# Patient Record
Sex: MALE | Race: WHITE | Employment: UNEMPLOYED | URBAN - METROPOLITAN AREA
[De-identification: names, ages, dates, MRNs, and addresses within clinical notes are randomized per-mention and may not be internally consistent; named-entity substitution may affect disease eponyms.]

---

## 2024-08-29 ENCOUNTER — HOSPITAL ENCOUNTER (EMERGENCY)
Age: 39
Discharge: HOME | End: 2024-08-29
Attending: EMERGENCY MEDICINE

## 2024-08-29 ENCOUNTER — APPOINTMENT (OUTPATIENT)
Dept: GENERAL RADIOLOGY | Age: 39
End: 2024-08-29

## 2024-08-29 VITALS
BODY MASS INDEX: 27.4 KG/M2 | RESPIRATION RATE: 17 BRPM | DIASTOLIC BLOOD PRESSURE: 83 MMHG | SYSTOLIC BLOOD PRESSURE: 133 MMHG | OXYGEN SATURATION: 100 % | TEMPERATURE: 97.9 F | WEIGHT: 185 LBS | HEART RATE: 54 BPM | HEIGHT: 69 IN

## 2024-08-29 DIAGNOSIS — K59.00 CONSTIPATION, UNSPECIFIED CONSTIPATION TYPE: Primary | ICD-10-CM

## 2024-08-29 LAB
ALBUMIN SERPL-MCNC: 4 G/DL (ref 3.5–5)
ALBUMIN/GLOB SERPL: 1.4 (ref 1–1.9)
ALP SERPL-CCNC: 63 U/L (ref 40–129)
ALT SERPL-CCNC: 16 U/L (ref 12–65)
ANION GAP SERPL CALC-SCNC: 9 MMOL/L (ref 9–18)
AST SERPL-CCNC: 28 U/L (ref 15–37)
BASOPHILS # BLD: 0.1 K/UL (ref 0–0.2)
BASOPHILS NFR BLD: 1 % (ref 0–2)
BILIRUB SERPL-MCNC: 0.7 MG/DL (ref 0–1.2)
BUN SERPL-MCNC: 13 MG/DL (ref 6–23)
CALCIUM SERPL-MCNC: 9.1 MG/DL (ref 8.8–10.2)
CHLORIDE SERPL-SCNC: 105 MMOL/L (ref 98–107)
CO2 SERPL-SCNC: 26 MMOL/L (ref 20–28)
CREAT SERPL-MCNC: 1.04 MG/DL (ref 0.8–1.3)
DIFFERENTIAL METHOD BLD: ABNORMAL
EOSINOPHIL # BLD: 0.4 K/UL (ref 0–0.8)
EOSINOPHIL NFR BLD: 6 % (ref 0.5–7.8)
ERYTHROCYTE [DISTWIDTH] IN BLOOD BY AUTOMATED COUNT: 12 % (ref 11.9–14.6)
GLOBULIN SER CALC-MCNC: 2.9 G/DL (ref 2.3–3.5)
GLUCOSE SERPL-MCNC: 100 MG/DL (ref 70–99)
HCT VFR BLD AUTO: 46 % (ref 41.1–50.3)
HGB BLD-MCNC: 15.9 G/DL (ref 13.6–17.2)
IMM GRANULOCYTES # BLD AUTO: 0 K/UL (ref 0–0.5)
IMM GRANULOCYTES NFR BLD AUTO: 0 % (ref 0–5)
LIPASE SERPL-CCNC: 17 U/L (ref 13–60)
LYMPHOCYTES # BLD: 1.3 K/UL (ref 0.5–4.6)
LYMPHOCYTES NFR BLD: 18 % (ref 13–44)
MCH RBC QN AUTO: 31.9 PG (ref 26.1–32.9)
MCHC RBC AUTO-ENTMCNC: 34.6 G/DL (ref 31.4–35)
MCV RBC AUTO: 92.2 FL (ref 82–102)
MONOCYTES # BLD: 0.5 K/UL (ref 0.1–1.3)
MONOCYTES NFR BLD: 7 % (ref 4–12)
NEUTS SEG # BLD: 4.9 K/UL (ref 1.7–8.2)
NEUTS SEG NFR BLD: 68 % (ref 43–78)
NRBC # BLD: 0 K/UL (ref 0–0.2)
PLATELET # BLD AUTO: 154 K/UL (ref 150–450)
PMV BLD AUTO: 12.9 FL (ref 9.4–12.3)
POTASSIUM SERPL-SCNC: 4.6 MMOL/L (ref 3.5–5.1)
PROT SERPL-MCNC: 6.8 G/DL (ref 6.3–8.2)
RBC # BLD AUTO: 4.99 M/UL (ref 4.23–5.6)
SODIUM SERPL-SCNC: 140 MMOL/L (ref 136–145)
WBC # BLD AUTO: 7.2 K/UL (ref 4.3–11.1)

## 2024-08-29 PROCEDURE — 85025 COMPLETE CBC W/AUTO DIFF WBC: CPT

## 2024-08-29 PROCEDURE — 83690 ASSAY OF LIPASE: CPT

## 2024-08-29 PROCEDURE — 2580000003 HC RX 258: Performed by: EMERGENCY MEDICINE

## 2024-08-29 PROCEDURE — 80053 COMPREHEN METABOLIC PANEL: CPT

## 2024-08-29 PROCEDURE — 99284 EMERGENCY DEPT VISIT MOD MDM: CPT

## 2024-08-29 PROCEDURE — 74018 RADEX ABDOMEN 1 VIEW: CPT

## 2024-08-29 RX ORDER — POLYETHYLENE GLYCOL 3350 17 G/17G
17 POWDER, FOR SOLUTION ORAL DAILY
Qty: 238 G | Refills: 1 | Status: SHIPPED | OUTPATIENT
Start: 2024-08-29 | End: 2024-09-13

## 2024-08-29 RX ORDER — 0.9 % SODIUM CHLORIDE 0.9 %
1000 INTRAVENOUS SOLUTION INTRAVENOUS
Status: COMPLETED | OUTPATIENT
Start: 2024-08-29 | End: 2024-08-29

## 2024-08-29 RX ORDER — DOCUSATE SODIUM 100 MG/1
100 CAPSULE, LIQUID FILLED ORAL 2 TIMES DAILY
Qty: 60 CAPSULE | Refills: 0 | Status: SHIPPED | OUTPATIENT
Start: 2024-08-29

## 2024-08-29 RX ORDER — ONDANSETRON 2 MG/ML
4 INJECTION INTRAMUSCULAR; INTRAVENOUS
Status: DISCONTINUED | OUTPATIENT
Start: 2024-08-29 | End: 2024-08-29 | Stop reason: HOSPADM

## 2024-08-29 RX ADMIN — SODIUM CHLORIDE 1000 ML: 9 INJECTION, SOLUTION INTRAVENOUS at 10:41

## 2024-08-29 ASSESSMENT — PAIN - FUNCTIONAL ASSESSMENT: PAIN_FUNCTIONAL_ASSESSMENT: NONE - DENIES PAIN

## 2024-08-29 ASSESSMENT — LIFESTYLE VARIABLES
HOW OFTEN DO YOU HAVE A DRINK CONTAINING ALCOHOL: NEVER
HOW MANY STANDARD DRINKS CONTAINING ALCOHOL DO YOU HAVE ON A TYPICAL DAY: PATIENT DOES NOT DRINK

## 2024-08-29 NOTE — ED PROVIDER NOTES
Emergency Department Provider Note       PCP: None, None   Age: 39 y.o.   Sex: male     DISPOSITION Decision To Discharge 08/29/2024 11:15:26 AM  Condition at Disposition: Stable       ICD-10-CM    1. Constipation, unspecified constipation type  K59.00           Medical Decision Making     10:09 AM EDT  Patient has a reassuring abdominal examination, no tenderness to palpation.  I do not think that CT imaging is indicated at this time.  Will obtain an x-ray to evaluate for stool burden, unlikely event of possible air-fluid levels.  Will also perform rectal exam to ensure the patient is not impacted.  If not impacted will plan to proceed with laxative    11:17 AM EDT  No evidence of impaction, x-ray shows normal bowel gas pattern, possible mild increase stool burden in ascending colon.  Will place the patient on stool softener and a laxative.  He can use enemas as needed.  We have discussed return precautions.  He is comfortable this plan understands reasons to return.  His abdominal exam remains reassuring I do not think additional imaging is indicated at this time.     1 acute complicated illness or injury.  Prescription drug management performed.    I independently ordered and reviewed each unique test.  I reviewed external records: provider visit note from PCP.  I reviewed external records: provider visit note from outside specialist.                     History     39-year-old male with a history of tachycardia on propranolol and clonidine presenting to the emergency department for evaluation of constipation.  Patient states he has not had a bowel movement in 2 weeks.  He notes some intermittent crampy abdominal discomfort.  Notes a history of constipation once prior that ultimately resolved with use of an oil-based enema.  Patient states that during the last 2 weeks he has taken a stool softener a couple of times, he used an oil-based enema 1 time.  States he is unsure if he has taken a laxative or not.  He  Result   No acute findings in the abdomen.      Electronically signed by Keron Muñiz                   No results for input(s): \"COVID19\" in the last 72 hours.     Voice dictation software was used during the making of this note.  This software is not perfect and grammatical and other typographical errors may be present.  This note has not been completely proofread for errors.     Jomar, Demetrius CASTRO MD  08/29/24 1295

## 2024-08-29 NOTE — DISCHARGE INSTRUCTIONS
ID band present and verified. Family is in the lobby.  Your evaluation in the emergency department today is reassuring.  Your x-ray shows some increased stool but no severe constipation, no concerning findings.  There is no impaction on rectal exam.    Take the laxative, MiraLAX, once a day as needed to have a bowel movement.  After 3 days if you are not having a bowel movement you can increase to twice a day.  Stop taking it once you have had a bowel movement.    Take Colace, the stool softener, twice a day for at least a month.  Discussed with your primary care doctor whether you should go on this medication for a longer period of time.    Return to the emergency department for any change in symptoms or concerns.

## 2024-08-29 NOTE — ED TRIAGE NOTES
Pt reports not having BM X 2 weeks. Reports lower abd pain. Denies vomiting. States he tried multiple OTC medications without relief.

## 2024-08-29 NOTE — ED NOTES
I have reviewed discharge instructions with the patient.  The patient verbalized understanding.    Patient left ED via Discharge Method: ambulatory to Home with self.    Opportunity for questions and clarification provided.       Patient given 2 scripts.         To continue your aftercare when you leave the hospital, you may receive an automated call from our care team to check in on how you are doing.  This is a free service and part of our promise to provide the best care and service to meet your aftercare needs.” If you have questions, or wish to unsubscribe from this service please call [phone].  Thank you for Choosing our [facility] Emergency Department.        Anna Covnigton LPN  08/29/24 1141

## 2024-09-30 ENCOUNTER — HOSPITAL ENCOUNTER (EMERGENCY)
Age: 39
Discharge: HOME | End: 2024-09-30

## 2024-09-30 VITALS
TEMPERATURE: 98 F | OXYGEN SATURATION: 98 % | RESPIRATION RATE: 16 BRPM | SYSTOLIC BLOOD PRESSURE: 139 MMHG | BODY MASS INDEX: 28.14 KG/M2 | HEIGHT: 69 IN | HEART RATE: 58 BPM | DIASTOLIC BLOOD PRESSURE: 99 MMHG | WEIGHT: 190 LBS

## 2024-09-30 DIAGNOSIS — K04.7 DENTAL ABSCESS: ICD-10-CM

## 2024-09-30 DIAGNOSIS — K08.89 PAIN, DENTAL: Primary | ICD-10-CM

## 2024-09-30 PROCEDURE — 6370000000 HC RX 637 (ALT 250 FOR IP): Performed by: PHYSICIAN ASSISTANT

## 2024-09-30 PROCEDURE — 99283 EMERGENCY DEPT VISIT LOW MDM: CPT

## 2024-09-30 RX ORDER — CLINDAMYCIN HCL 150 MG
300 CAPSULE ORAL
Status: COMPLETED | OUTPATIENT
Start: 2024-09-30 | End: 2024-09-30

## 2024-09-30 RX ORDER — CLINDAMYCIN HCL 150 MG
300 CAPSULE ORAL 4 TIMES DAILY
Qty: 80 CAPSULE | Refills: 0 | Status: SHIPPED | OUTPATIENT
Start: 2024-09-30 | End: 2024-10-10

## 2024-09-30 RX ORDER — NAPROXEN 250 MG/1
500 TABLET ORAL
Status: COMPLETED | OUTPATIENT
Start: 2024-09-30 | End: 2024-09-30

## 2024-09-30 RX ORDER — DICLOFENAC POTASSIUM 50 MG/1
50 TABLET, FILM COATED ORAL 3 TIMES DAILY PRN
Qty: 30 TABLET | Refills: 0 | Status: SHIPPED | OUTPATIENT
Start: 2024-09-30

## 2024-09-30 RX ADMIN — CLINDAMYCIN HYDROCHLORIDE 300 MG: 150 CAPSULE ORAL at 09:29

## 2024-09-30 RX ADMIN — NAPROXEN 500 MG: 250 TABLET ORAL at 09:29

## 2024-09-30 ASSESSMENT — LIFESTYLE VARIABLES
HOW MANY STANDARD DRINKS CONTAINING ALCOHOL DO YOU HAVE ON A TYPICAL DAY: PATIENT DOES NOT DRINK
HOW OFTEN DO YOU HAVE A DRINK CONTAINING ALCOHOL: NEVER

## 2024-09-30 ASSESSMENT — PAIN SCALES - GENERAL: PAINLEVEL_OUTOF10: 5

## 2024-09-30 ASSESSMENT — PAIN DESCRIPTION - LOCATION: LOCATION: TEETH

## 2024-09-30 ASSESSMENT — PAIN - FUNCTIONAL ASSESSMENT: PAIN_FUNCTIONAL_ASSESSMENT: 0-10

## 2024-09-30 NOTE — ED TRIAGE NOTES
Pt ambulatory to ED c/o R sided dental abscess since yesterday afternoon. Does not see dentist. Denies fever/chills. Swelling noted at time of triage.

## 2024-09-30 NOTE — ED PROVIDER NOTES
Emergency Department Provider Note       PCP: None, None   Age: 39 y.o.   Sex: male     DISPOSITION Discharge - Pending Orders Complete 09/30/2024 09:11:33 AM  Condition at Disposition: Data Unavailable       ICD-10-CM    1. Pain, dental  K08.89       2. Dental abscess  K04.7           Medical Decision Making     Patient appears to have a dental abscess today without difficulty swallowing. Drink plenty of fluids, rest, take all of the antibiotics as directed and follow-up with a dentist for recheck.  He is stable for discharge and ambulatory out of the ER without difficulty.       1 or more acute illnesses that pose a threat to life or bodily function.   Over the counter drug management performed.  Prescription drug management performed.  Shared medical decision making was utilized in creating the patients health plan today.  Considerations: The following items were considered but not ordered: Further evaluation.    I independently ordered and reviewed each unique test.  I reviewed external records: ED visit note from an outside group.   The patients assessment required an independent historian: None.  The reason they were needed is .                History     Patient is here with tenderness to the upper right gum for the last 1 days.  There is some swelling there today.  No fever or difficulty swallowing.  No swelling to the tongue.  No headache, nausea, vomiting, chest pain, shortness of breath, abdominal pain, dizziness, weakness, dyspnea exertion, orthopnea, swelling/tingling or weakness to arms or legs, trouble with urination or bowel movements or other new symptoms.  They did ambulate to the room without difficulty and is well-hydrated.     The history is provided by the patient.       ROS     Review of Systems     Physical Exam     Vitals signs and nursing note reviewed:  Vitals:    09/30/24 0808   BP: (!) 139/99   Pulse: 58   Resp: 16   Temp: 98 °F (36.7 °C)   SpO2: 98%   Weight: 86.2 kg (190 lb)

## 2024-09-30 NOTE — DISCHARGE INSTRUCTIONS
Drink plenty of fluids, rest, take all of the antibiotics as directed and follow-up with a dentist for recheck.      Dental Services    [facility]  Please call [phone] as soon as possible to set up close follow-up.       Address: [address] [place] [address]  Hours Monday - Friday 10am - 6:30pm Weekends on call for emergencies     Listed below are free or low-cost options.    [facility] [address] [place] [id number] [phone]  Tuesday and Thursday- registration starts at 10am. After registering you are given a time to return  Provides free x-rays, extractions, treatment of infection, and dental hygeine.   Cannot have medicare, medicaid or other medical insurance coverage  Bring proof of [facility]** residency (power bill, for example), Social Security Number and household income documents (including food stamps) as well as any current medications.    (**if you do not live in [facility], contact the free clinic in your home county for the availability of dental services)    [address] [address] [phone]  Monday and Wednesday 8a-5p Tuesday and Thursday    8a-7p Friday 12-5p  Provides Adult and Childrens services. X-rays, extractions, treatment of infection, restorative care  Accepts medicaid, private insurance, self-pay. Fees are on a sliding scale based on income (need to bring documentation)    [facility] [address] [place] [address]     [phone]  Monday - Friday 8am-5p  Accepts medicaid for dental (but not dentures under 21)  Provides xrays, extractions, partials and dentures    [facility] [address] [place] [address], [address], [place], [place] [id number] [phone]  Monday- Friday 9a-5p  First Come- First Served  Accepts medicaid, or self pay at or near medicaid price.  Urgent Care only for xrays, extractions

## 2024-09-30 NOTE — ED NOTES
Patient mobility status  with no difficulty. Provider aware     I have reviewed discharge instructions with the patient.  The patient verbalized understanding.    Patient left ED via Discharge Method: ambulatory to Home with Friend.    Opportunity for questions and clarification provided.     Patient given 2 scripts.           Roni Portillo RN  09/30/24 9050

## 2024-10-04 ENCOUNTER — HOSPITAL ENCOUNTER (EMERGENCY)
Age: 39
Discharge: PSYCHIATRIC OR SUBSTANCE USE FACILITY | End: 2024-10-07
Attending: STUDENT IN AN ORGANIZED HEALTH CARE EDUCATION/TRAINING PROGRAM

## 2024-10-04 DIAGNOSIS — R45.851 SUICIDAL IDEATION: ICD-10-CM

## 2024-10-04 DIAGNOSIS — F22 PARANOIA (HCC): Primary | ICD-10-CM

## 2024-10-04 DIAGNOSIS — R44.0 AUDITORY HALLUCINATION: ICD-10-CM

## 2024-10-04 LAB
ALBUMIN SERPL-MCNC: 4 G/DL (ref 3.5–5)
ALBUMIN/GLOB SERPL: 1.3 (ref 1–1.9)
ALP SERPL-CCNC: 72 U/L (ref 40–129)
ALT SERPL-CCNC: 21 U/L (ref 8–55)
AMPHET UR QL SCN: NEGATIVE
ANION GAP SERPL CALC-SCNC: 10 MMOL/L (ref 9–18)
AST SERPL-CCNC: 25 U/L (ref 15–37)
BARBITURATES UR QL SCN: NEGATIVE
BASOPHILS # BLD: 0.1 K/UL (ref 0–0.2)
BASOPHILS NFR BLD: 1 % (ref 0–2)
BENZODIAZ UR QL: NEGATIVE
BILIRUB SERPL-MCNC: 0.4 MG/DL (ref 0–1.2)
BUN SERPL-MCNC: 16 MG/DL (ref 6–23)
CALCIUM SERPL-MCNC: 9.6 MG/DL (ref 8.8–10.2)
CANNABINOIDS UR QL SCN: NEGATIVE
CHLORIDE SERPL-SCNC: 102 MMOL/L (ref 98–107)
CO2 SERPL-SCNC: 28 MMOL/L (ref 20–28)
COCAINE UR QL SCN: NEGATIVE
CREAT SERPL-MCNC: 0.92 MG/DL (ref 0.8–1.3)
DIFFERENTIAL METHOD BLD: ABNORMAL
EOSINOPHIL # BLD: 0.1 K/UL (ref 0–0.8)
EOSINOPHIL NFR BLD: 2 % (ref 0.5–7.8)
ERYTHROCYTE [DISTWIDTH] IN BLOOD BY AUTOMATED COUNT: 11.9 % (ref 11.9–14.6)
ETHANOL SERPL-MCNC: <11 MG/DL (ref 0–0.08)
GLOBULIN SER CALC-MCNC: 3 G/DL (ref 2.3–3.5)
GLUCOSE SERPL-MCNC: 78 MG/DL (ref 70–99)
HCT VFR BLD AUTO: 45.6 % (ref 41.1–50.3)
HGB BLD-MCNC: 16.1 G/DL (ref 13.6–17.2)
IMM GRANULOCYTES # BLD AUTO: 0 K/UL (ref 0–0.5)
IMM GRANULOCYTES NFR BLD AUTO: 0 % (ref 0–5)
LYMPHOCYTES # BLD: 1.4 K/UL (ref 0.5–4.6)
LYMPHOCYTES NFR BLD: 21 % (ref 13–44)
MCH RBC QN AUTO: 31.9 PG (ref 26.1–32.9)
MCHC RBC AUTO-ENTMCNC: 35.3 G/DL (ref 31.4–35)
MCV RBC AUTO: 90.3 FL (ref 82–102)
METHADONE UR QL: NEGATIVE
MONOCYTES # BLD: 0.6 K/UL (ref 0.1–1.3)
MONOCYTES NFR BLD: 9 % (ref 4–12)
NEUTS SEG # BLD: 4.4 K/UL (ref 1.7–8.2)
NEUTS SEG NFR BLD: 67 % (ref 43–78)
NRBC # BLD: 0 K/UL (ref 0–0.2)
OPIATES UR QL: NEGATIVE
PCP UR QL: NEGATIVE
PLATELET # BLD AUTO: 179 K/UL (ref 150–450)
PMV BLD AUTO: 13.1 FL (ref 9.4–12.3)
POTASSIUM SERPL-SCNC: 4.5 MMOL/L (ref 3.5–5.1)
PROT SERPL-MCNC: 6.9 G/DL (ref 6.3–8.2)
RBC # BLD AUTO: 5.05 M/UL (ref 4.23–5.6)
SODIUM SERPL-SCNC: 140 MMOL/L (ref 136–145)
WBC # BLD AUTO: 6.7 K/UL (ref 4.3–11.1)

## 2024-10-04 PROCEDURE — 2580000003 HC RX 258: Performed by: STUDENT IN AN ORGANIZED HEALTH CARE EDUCATION/TRAINING PROGRAM

## 2024-10-04 PROCEDURE — 80307 DRUG TEST PRSMV CHEM ANLYZR: CPT

## 2024-10-04 PROCEDURE — 99285 EMERGENCY DEPT VISIT HI MDM: CPT

## 2024-10-04 PROCEDURE — 96374 THER/PROPH/DIAG INJ IV PUSH: CPT

## 2024-10-04 PROCEDURE — 85025 COMPLETE CBC W/AUTO DIFF WBC: CPT

## 2024-10-04 PROCEDURE — 6360000002 HC RX W HCPCS: Performed by: STUDENT IN AN ORGANIZED HEALTH CARE EDUCATION/TRAINING PROGRAM

## 2024-10-04 PROCEDURE — 82077 ASSAY SPEC XCP UR&BREATH IA: CPT

## 2024-10-04 PROCEDURE — 6370000000 HC RX 637 (ALT 250 FOR IP): Performed by: STUDENT IN AN ORGANIZED HEALTH CARE EDUCATION/TRAINING PROGRAM

## 2024-10-04 PROCEDURE — 80053 COMPREHEN METABOLIC PANEL: CPT

## 2024-10-04 RX ORDER — 0.9 % SODIUM CHLORIDE 0.9 %
1000 INTRAVENOUS SOLUTION INTRAVENOUS
Status: COMPLETED | OUTPATIENT
Start: 2024-10-04 | End: 2024-10-04

## 2024-10-04 RX ORDER — OLANZAPINE 5 MG/1
5 TABLET, ORALLY DISINTEGRATING ORAL NIGHTLY
Status: DISCONTINUED | OUTPATIENT
Start: 2024-10-04 | End: 2024-10-05

## 2024-10-04 RX ORDER — LORAZEPAM 2 MG/ML
1 INJECTION INTRAMUSCULAR ONCE
Status: COMPLETED | OUTPATIENT
Start: 2024-10-04 | End: 2024-10-04

## 2024-10-04 RX ADMIN — LORAZEPAM 1 MG: 2 INJECTION INTRAMUSCULAR; INTRAVENOUS at 21:51

## 2024-10-04 RX ADMIN — OLANZAPINE 5 MG: 5 TABLET, ORALLY DISINTEGRATING ORAL at 22:00

## 2024-10-04 RX ADMIN — SODIUM CHLORIDE 1000 ML: 9 INJECTION, SOLUTION INTRAVENOUS at 20:40

## 2024-10-04 ASSESSMENT — LIFESTYLE VARIABLES
HOW OFTEN DO YOU HAVE A DRINK CONTAINING ALCOHOL: MONTHLY OR LESS
HOW MANY STANDARD DRINKS CONTAINING ALCOHOL DO YOU HAVE ON A TYPICAL DAY: 3 OR 4

## 2024-10-04 ASSESSMENT — PAIN - FUNCTIONAL ASSESSMENT: PAIN_FUNCTIONAL_ASSESSMENT: NONE - DENIES PAIN

## 2024-10-04 NOTE — ED TRIAGE NOTES
Presents from home via POV complaining of being paranoid and suicidal with a hx of Bipolar. Sx's started approximately 2-3 days. Paranoia for years, just can't cope with it. He reports several life stressors. Mom states he's not sleeping. He gets seen at [facility]. Mom feels he may not be forth coming with sx's so he is not being treated properly as a result.. Plan is to jump out into traffic or crash his car. Suicidal thoughts in the past but no attempts to harm himself in the past. Mom says he is pacing up and down, can't sleep. Hearing voices and they tell him he is taking up space and he has nothing to offer and he is in the way

## 2024-10-05 PROCEDURE — 6370000000 HC RX 637 (ALT 250 FOR IP): Performed by: EMERGENCY MEDICINE

## 2024-10-05 RX ORDER — GABAPENTIN 300 MG/1
300 CAPSULE ORAL 3 TIMES DAILY
Status: DISCONTINUED | OUTPATIENT
Start: 2024-10-05 | End: 2024-10-07 | Stop reason: HOSPADM

## 2024-10-05 RX ORDER — CLONIDINE HYDROCHLORIDE 0.1 MG/1
0.1 TABLET ORAL
Status: DISCONTINUED | OUTPATIENT
Start: 2024-10-05 | End: 2024-10-07 | Stop reason: HOSPADM

## 2024-10-05 RX ORDER — LORAZEPAM 1 MG/1
0.5 TABLET ORAL 2 TIMES DAILY
Status: DISCONTINUED | OUTPATIENT
Start: 2024-10-05 | End: 2024-10-07 | Stop reason: HOSPADM

## 2024-10-05 RX ORDER — MIRTAZAPINE 15 MG/1
30 TABLET, FILM COATED ORAL NIGHTLY
Status: DISCONTINUED | OUTPATIENT
Start: 2024-10-05 | End: 2024-10-07 | Stop reason: HOSPADM

## 2024-10-05 RX ORDER — CLINDAMYCIN HCL 150 MG
300 CAPSULE ORAL EVERY 6 HOURS SCHEDULED
Status: DISCONTINUED | OUTPATIENT
Start: 2024-10-05 | End: 2024-10-07 | Stop reason: HOSPADM

## 2024-10-05 RX ORDER — OLANZAPINE 5 MG/1
5 TABLET ORAL 2 TIMES DAILY
Status: DISCONTINUED | OUTPATIENT
Start: 2024-10-05 | End: 2024-10-07 | Stop reason: HOSPADM

## 2024-10-05 RX ADMIN — LORAZEPAM 0.5 MG: 1 TABLET ORAL at 08:59

## 2024-10-05 RX ADMIN — LORAZEPAM 0.5 MG: 1 TABLET ORAL at 20:30

## 2024-10-05 RX ADMIN — CLINDAMYCIN HYDROCHLORIDE 300 MG: 150 CAPSULE ORAL at 17:30

## 2024-10-05 RX ADMIN — CLINDAMYCIN HYDROCHLORIDE 300 MG: 150 CAPSULE ORAL at 15:51

## 2024-10-05 RX ADMIN — GABAPENTIN 300 MG: 300 CAPSULE ORAL at 15:51

## 2024-10-05 RX ADMIN — OLANZAPINE 5 MG: 5 TABLET, FILM COATED ORAL at 20:30

## 2024-10-05 RX ADMIN — GABAPENTIN 300 MG: 300 CAPSULE ORAL at 20:30

## 2024-10-05 RX ADMIN — GABAPENTIN 300 MG: 300 CAPSULE ORAL at 08:59

## 2024-10-05 RX ADMIN — MIRTAZAPINE 30 MG: 15 TABLET, FILM COATED ORAL at 20:30

## 2024-10-05 RX ADMIN — CLINDAMYCIN HYDROCHLORIDE 300 MG: 150 CAPSULE ORAL at 08:58

## 2024-10-05 RX ADMIN — OLANZAPINE 5 MG: 5 TABLET, FILM COATED ORAL at 08:59

## 2024-10-05 ASSESSMENT — PAIN SCALES - GENERAL: PAINLEVEL_OUTOF10: 0

## 2024-10-05 ASSESSMENT — PAIN - FUNCTIONAL ASSESSMENT: PAIN_FUNCTIONAL_ASSESSMENT: 0-10

## 2024-10-05 NOTE — ED NOTES
Patient resting quietly in bed with eyes closed. Sitter at bedside. No distress noted     Richelle Heart RN  10/05/24 3938

## 2024-10-05 NOTE — ED NOTES
Explained to patient and his Mother how Tele Psych consult works and encouraged to ask any and all questions. Psych MD currently logged in and speaking with Patient     Richelle Heart RN  10/04/24 7932

## 2024-10-05 NOTE — ED NOTES
Constant Observer Yes - Name: Brian   Duane Observer Oriented yes   High risk patients are in line of sight at all times Yes   Excess equipment/medical supplies not necessary for the care of the patient removed Yes   All sharp or dangerous objects are removed from room: including but not limited to belts, pens & pencils, needles, medications, cosmetics, lighters, matches, nail files, watches, necklaces, glass objects, razors, razor blades, knives, aerosol sprays, drawstring pants, shoes, cords (telephone, call bells, etc.) cleaning wipes or other cleaning items, aluminum cans, not permanently attached wall décor Yes   Telephone/cell phone removed as well as TV remote (batteries can be swallowed) Yes   Patient belongings removed and labeled at nurses station Yes   Excess linen is removed from room Yes   All plastic bags are removed from the room and replaced with paper trash bags Yes   Patient is in paper scrubs or appropriate gown and using hospital socks with rubber soles Yes   No metal, hard eating utensils or hard plates are on meal tray Yes   Remove all cleaning agents used by Environmental Services Yes   If Crucifix is hanging on a nail, remove Crucifix as well as the nail Yes       *If any question above is answered \"No,\" documentation is required.       Brian Ivory RN  10/05/24 7198

## 2024-10-05 NOTE — ED NOTES
Constant Observer Yes - Name: Chani Zepeda Observer Oriented yes   High risk patients are in line of sight at all times Yes   Excess equipment/medical supplies not necessary for the care of the patient removed Yes   All sharp or dangerous objects are removed from room: including but not limited to belts, pens & pencils, needles, medications, cosmetics, lighters, matches, nail files, watches, necklaces, glass objects, razors, razor blades, knives, aerosol sprays, drawstring pants, shoes, cords (telephone, call bells, etc.) cleaning wipes or other cleaning items, aluminum cans, not permanently attached wall décor Yes   Telephone/cell phone removed as well as TV remote (batteries can be swallowed) Yes   Patient belongings removed and labeled at nurses station Yes   Excess linen is removed from room Yes   All plastic bags are removed from the room and replaced with paper trash bags Yes   Patient is in paper scrubs or appropriate gown and using hospital socks with rubber soles Yes   No metal, hard eating utensils or hard plates are on meal tray Yes   Remove all cleaning agents used by Environmental Services Yes   If Crucifix is hanging on a nail, remove Crucifix as well as the nail Yes       *If any question above is answered \"No,\" documentation is required.        Jun Caal RN  10/05/24 1980

## 2024-10-05 NOTE — ED NOTES
Patient resting quietly in bed with eyes closed. No distress noted. Sitter at bedside.      Richelle Heart RN  10/05/24 0567

## 2024-10-05 NOTE — ED NOTES
Patient laying in bed with eyes closed. Sitter at bedside.      Richelle Heart, RN  10/05/24 5452

## 2024-10-05 NOTE — ED NOTES
Patient sitting up in bed wake talking with his family. Sitter at bedside.     Richelle Heart, RN  10/04/24 4054

## 2024-10-05 NOTE — ED NOTES
Patient resting in bed with eyes closed. Sitter at bedside.      Richelle Heart, RN  10/05/24 1157

## 2024-10-05 NOTE — ED NOTES
Pt in bed with eyes open eating. No distress noted, equal chest rise and fall. Sitter at bedside.      Jun Caal RN  10/05/24 8835

## 2024-10-05 NOTE — ED NOTES
Patient resting in bed with eyes closed. Sitter at bedside.      Richelle Heart RN  10/05/24 0593

## 2024-10-05 NOTE — ED NOTES
Spoke with Tele Psych MD and gave report. Tele monitor setup in room.     Richelle Heart, RN  10/04/24 1305

## 2024-10-05 NOTE — ED NOTES
Patient sitting up in bed. Requested a snack. Snack provided. Denies any other needs at this time. Sitter at bedside.      Richelle Heart, RN  10/04/24 5089

## 2024-10-05 NOTE — CONSULTS
Arranged consult with [facility] via web site. Consult ID: [id number]

## 2024-10-05 NOTE — ED NOTES
Called pharmacy to get home medication verified and Pharmacy stated unable to administer home medications that it would have to be dispensed from hospital medications. MD notified.      Richelle Heart, RN  10/05/24 0646

## 2024-10-05 NOTE — ED NOTES
Patient laying in bed with eyes closed. Respirations even and unlabored. Sitter at bedside.      Richelle Heart RN  10/05/24 0910

## 2024-10-05 NOTE — ED NOTES
Constant Observer Yes - Name: Mayra Zepeda Observer Oriented yes   High risk patients are in line of sight at all times Yes   Excess equipment/medical supplies not necessary for the care of the patient removed Yes   All sharp or dangerous objects are removed from room: including but not limited to belts, pens & pencils, needles, medications, cosmetics, lighters, matches, nail files, watches, necklaces, glass objects, razors, razor blades, knives, aerosol sprays, drawstring pants, shoes, cords (telephone, call bells, etc.) cleaning wipes or other cleaning items, aluminum cans, not permanently attached wall décor Yes   Telephone/cell phone removed as well as TV remote (batteries can be swallowed) Yes   Patient belongings removed and labeled at nurses station Yes   Excess linen is removed from room Yes   All plastic bags are removed from the room and replaced with paper trash bags Yes   Patient is in paper scrubs or appropriate gown and using hospital socks with rubber soles Yes   No metal, hard eating utensils or hard plates are on meal tray Yes   Remove all cleaning agents used by Environmental Services Yes   If Crucifix is hanging on a nail, remove Crucifix as well as the nail Yes       *If any question above is answered \"No,\" documentation is required.       Richelle Heart RN  10/04/24 2056

## 2024-10-05 NOTE — ED NOTES
Pt in bed with eyes open, no distress noted. Equal chest rise and fall. Sitter at bedside.      Jun Caal RN  10/05/24 5423

## 2024-10-06 PROCEDURE — 6370000000 HC RX 637 (ALT 250 FOR IP): Performed by: EMERGENCY MEDICINE

## 2024-10-06 RX ADMIN — LORAZEPAM 0.5 MG: 1 TABLET ORAL at 09:40

## 2024-10-06 RX ADMIN — OLANZAPINE 5 MG: 5 TABLET, FILM COATED ORAL at 09:41

## 2024-10-06 RX ADMIN — OLANZAPINE 5 MG: 5 TABLET, FILM COATED ORAL at 20:47

## 2024-10-06 RX ADMIN — MIRTAZAPINE 30 MG: 15 TABLET, FILM COATED ORAL at 20:47

## 2024-10-06 RX ADMIN — CLONIDINE HYDROCHLORIDE 0.1 MG: 0.1 TABLET ORAL at 20:47

## 2024-10-06 RX ADMIN — CLINDAMYCIN HYDROCHLORIDE 300 MG: 150 CAPSULE ORAL at 00:00

## 2024-10-06 RX ADMIN — CLINDAMYCIN HYDROCHLORIDE 300 MG: 150 CAPSULE ORAL at 20:46

## 2024-10-06 RX ADMIN — GABAPENTIN 300 MG: 300 CAPSULE ORAL at 09:40

## 2024-10-06 RX ADMIN — GABAPENTIN 300 MG: 300 CAPSULE ORAL at 20:47

## 2024-10-06 NOTE — ED NOTES
Report taken from rin vazquez. Pt in room with eyes closed and even chest rise and fall noted. Appears to be in no apparent distress.     Jaxon Somers RN  10/06/24 6444

## 2024-10-06 NOTE — ED NOTES
Report from SUE Barahona.  Pt in no distress, resting with eyes closed, respirations even/unlabored.     Bindu Koch RN  10/06/24 2919

## 2024-10-06 NOTE — ED NOTES
Constant Observer Yes   Constant Observer Oriented Yes   High risk patients are in line of sight at all times Yes   Excess equipment/medical supplies not necessary for the care of the patient removed Yes   All sharp or dangerous objects are removed from room: including but not limited to belts, pens & pencils, needles, medications, cosmetics, lighters, matches, nail files, watches, necklaces, glass objects, razors, razor blades, knives, aerosol sprays, drawstring pants, shoes, cords (telephone, call bells, etc.) cleaning wipes or other cleaning items, aluminum cans, not permanently attached wall décor Yes   Telephone/cell phone removed as well as TV remote (batteries can be swallowed) Yes   Patient belongings removed and labeled at nurses station Yes   Excess linen is removed from room Yes   All plastic bags are removed from the room and replaced with paper trash bags Yes   Patient is in paper scrubs or appropriate gown and using hospital socks with rubber soles Yes   No metal, hard eating utensils or hard plates are on meal tray Yes   Remove all cleaning agents used by Environmental Services Yes   If Crucifix is hanging on a nail, remove Crucifix as well as the nail Yes       *If any question above is answered \"No,\" documentation is required.       Shalom Mcmahon RN  10/06/24 1920       Shlaom Mcmahon RN  10/06/24 2346       Shalom Mcmahon RN  10/06/24 234

## 2024-10-06 NOTE — ED PROVIDER NOTES
Patient on commitment papers which  .  Saw psychiatry last night and made recommendations.  They have been ordered.  Very sleepy from medications at this point.  No complaints.  Did eat breakfast.  Lab work reviewed without any abnormalities.  Plan at this point is continue medications and assess for continued improvement.  In all likelihood, no beds will become available this weekend.  Psychiatric reevaluation Monday morning.  Luis F Rock MD  10/05/24 6865    
[facility] ED Psychiatric RECHECK NOTE for 10/6/2024  Arrival Date/Time: 10/4/2024  8:07 PM      Gino Barakat  MRN: [id number]    YOB: 1985   39 y.o. male    [facility] EMERGENCY DEPT ER09/09  Reeval on 10/6/2024 @ 12:43 PM       He is on involuntary commitment papers.  They  on 10/7/24    He has completed a behavioral health evaluation.     Home medications and recommended psychiatric medications have been ordered.      Gino Barakat is a 39 y.o. male originally presented for suicidal thoughts.      Interval history: PT remains on white papers. Has seen psychiatry. Meds ordered. Resting in bed with no overnight complications. No complaints today.      Vitals:    10/05/24 0900 10/05/24 1549 10/05/24 1550 10/06/24 0700   BP: (!) 152/88  (!) 144/83 (!) 155/72   Pulse: 62 56  (!) 49   Resp: 16 16  14   Temp: 98.7 °F (37.1 °C) 98.2 °F (36.8 °C)  97.8 °F (36.6 °C)   TempSrc:  Oral  Oral   SpO2: 96% 96%  95%   Weight:       Height:          Current Facility-Administered Medications   Medication Dose Route Frequency    OLANZapine (ZYPREXA) tablet 5 mg  5 mg Oral BID    LORazepam (ATIVAN) tablet 0.5 mg  0.5 mg Oral BID    mirtazapine (REMERON) tablet 30 mg  30 mg Oral Nightly    gabapentin (NEURONTIN) capsule 300 mg  300 mg Oral TID    cloNIDine (CATAPRES) tablet 0.1 mg  0.1 mg Oral QHS    clindamycin (CLEOCIN) capsule 300 mg  300 mg Oral 4 times per day     Current Outpatient Medications   Medication Sig    clindamycin (CLEOCIN) 150 MG capsule Take 2 capsules by mouth 4 times daily for 10 days    diclofenac (CATAFLAM) 50 MG tablet Take 1 tablet by mouth 3 times daily as needed for Pain (With food)    docusate sodium (COLACE) 100 MG capsule Take 1 capsule by mouth 2 times daily       Assessment and Plan:  Keep on commitment papers and reassess tomorrow.       Ronny Preston III, MD; 10/6/2024 12:43 PM ===============                      Ronny Preston III, MD  10/06/24 7643    
atraumatic.      Right Ear: External ear normal.      Left Ear: External ear normal.      Nose: Nose normal.      Mouth/Throat:      Mouth: Mucous membranes are moist.   Eyes:      General: No scleral icterus.        Right eye: No discharge.         Left eye: No discharge.      Extraocular Movements: Extraocular movements intact.   Cardiovascular:      Rate and Rhythm: Normal rate and regular rhythm.      Pulses: Normal pulses.      Heart sounds: Normal heart sounds.   Pulmonary:      Effort: Pulmonary effort is normal. No tachypnea, bradypnea, accessory muscle usage, prolonged expiration or respiratory distress.      Breath sounds: Normal breath sounds and air entry. No stridor. No decreased breath sounds, wheezing, rhonchi or rales.   Abdominal:      General: Abdomen is flat. There is no distension.      Palpations: There is no mass.      Tenderness: There is no abdominal tenderness. There is no right CVA tenderness, left CVA tenderness, guarding or rebound. Negative signs include Younger's sign and McBurney's sign.      Hernia: No hernia is present.   Musculoskeletal:         General: No swelling, tenderness or deformity. Normal range of motion.      Cervical back: Normal range of motion.   Skin:     General: Skin is warm.      Capillary Refill: Capillary refill takes less than 2 seconds.   Neurological:      General: No focal deficit present.      Mental Status: He is alert.   Psychiatric:         Attention and Perception: He is inattentive.         Mood and Affect: Mood is anxious. Affect is inappropriate.         Speech: Speech is tangential.         Behavior: Behavior is hyperactive.         Thought Content: Thought content is paranoid. Thought content includes suicidal ideation. Thought content does not include homicidal ideation. Thought content includes suicidal plan. Thought content does not include homicidal plan.      Comments: Patient does appear somewhat anxious, paranoid during my assessment.  While

## 2024-10-06 NOTE — ED NOTES
Pt in room with eyes closed and even chest rise and fall noted. Appears to be in no apparent distress.     Jaxon Somers RN  10/06/24 0621

## 2024-10-06 NOTE — ED NOTES
Constant Observer Yes - Name: Chani Zepeda Observer Oriented yes   High risk patients are in line of sight at all times Yes   Excess equipment/medical supplies not necessary for the care of the patient removed Yes   All sharp or dangerous objects are removed from room: including but not limited to belts, pens & pencils, needles, medications, cosmetics, lighters, matches, nail files, watches, necklaces, glass objects, razors, razor blades, knives, aerosol sprays, drawstring pants, shoes, cords (telephone, call bells, etc.) cleaning wipes or other cleaning items, aluminum cans, not permanently attached wall décor Yes   Telephone/cell phone removed as well as TV remote (batteries can be swallowed) Yes   Patient belongings removed and labeled at nurses station Yes   Excess linen is removed from room Yes   All plastic bags are removed from the room and replaced with paper trash bags Yes   Patient is in paper scrubs or appropriate gown and using hospital socks with rubber soles Yes   No metal, hard eating utensils or hard plates are on meal tray Yes   Remove all cleaning agents used by Environmental Services Yes   If Crucifix is hanging on a nail, remove Crucifix as well as the nail Yes       *If any question above is answered \"No,\" documentation is required.       Jun Caal RN  10/05/24 6100

## 2024-10-06 NOTE — ED NOTES
Constant Observer Yes - Name: Brian   Duane Observer Oriented yes   High risk patients are in line of sight at all times Yes   Excess equipment/medical supplies not necessary for the care of the patient removed Yes   All sharp or dangerous objects are removed from room: including but not limited to belts, pens & pencils, needles, medications, cosmetics, lighters, matches, nail files, watches, necklaces, glass objects, razors, razor blades, knives, aerosol sprays, drawstring pants, shoes, cords (telephone, call bells, etc.) cleaning wipes or other cleaning items, aluminum cans, not permanently attached wall décor Yes   Telephone/cell phone removed as well as TV remote (batteries can be swallowed) Yes   Patient belongings removed and labeled at nurses station Yes   Excess linen is removed from room Yes   All plastic bags are removed from the room and replaced with paper trash bags Yes   Patient is in paper scrubs or appropriate gown and using hospital socks with rubber soles Yes   No metal, hard eating utensils or hard plates are on meal tray Yes   Remove all cleaning agents used by [employer] Yes   If Crucifix is hanging on a nail, remove Crucifix as well as the nail Yes       *If any question above is answered \"No,\" documentation is required.       Brian Ivory RN  10/06/24 1022

## 2024-10-07 VITALS
HEIGHT: 69 IN | SYSTOLIC BLOOD PRESSURE: 128 MMHG | BODY MASS INDEX: 29.16 KG/M2 | RESPIRATION RATE: 14 BRPM | DIASTOLIC BLOOD PRESSURE: 76 MMHG | WEIGHT: 196.9 LBS | TEMPERATURE: 97.5 F | HEART RATE: 53 BPM | OXYGEN SATURATION: 99 %

## 2024-10-07 PROCEDURE — 6370000000 HC RX 637 (ALT 250 FOR IP): Performed by: EMERGENCY MEDICINE

## 2024-10-07 RX ADMIN — LORAZEPAM 0.5 MG: 1 TABLET ORAL at 08:46

## 2024-10-07 RX ADMIN — CLINDAMYCIN HYDROCHLORIDE 300 MG: 150 CAPSULE ORAL at 07:07

## 2024-10-07 RX ADMIN — OLANZAPINE 5 MG: 5 TABLET, FILM COATED ORAL at 08:46

## 2024-10-07 RX ADMIN — GABAPENTIN 300 MG: 300 CAPSULE ORAL at 08:46

## 2024-10-07 NOTE — ED NOTES
Patient mobility status  with no difficulty. Provider aware     I have reviewed discharge instructions with the patient.  The patient verbalized understanding.    Patient left ED via Discharge Method: ambulatory to Rehab with  [facility] .    Opportunity for questions and clarification provided.     Patient given 0 scripts.           Holden Bryan, RN  10/07/24 1100

## 2024-10-07 NOTE — CARE COORDINATION
Patient:    Gino Barakat      Room 9    Transfer to [facility]:   [address] [place] [address]       [address]    Accepting MD:     Dr. Seth Torres  RN to call report:  [phone]    Patient gives permission to notify mother of transfer.  Call to Jie [phone] to inform her of transfer

## 2024-10-07 NOTE — ED NOTES
Pt observed in room with eyes closed. Even chest rise and fall noted. Appears to be in no apparent distress. Sitter at bedside.     Jaxon Somers RN  10/07/24 9326

## 2024-10-07 NOTE — ED NOTES
Constant Observer No   Constant Observer Oriented N/A   High risk patients are in line of sight at all times Yes   Excess equipment/medical supplies not necessary for the care of the patient removed Yes   All sharp or dangerous objects are removed from room: including but not limited to belts, pens & pencils, needles, medications, cosmetics, lighters, matches, nail files, watches, necklaces, glass objects, razors, razor blades, knives, aerosol sprays, drawstring pants, shoes, cords (telephone, call bells, etc.) cleaning wipes or other cleaning items, aluminum cans, not permanently attached wall décor Yes   Telephone/cell phone removed as well as TV remote (batteries can be swallowed) Yes   Patient belongings removed and labeled at nurses station Yes   Excess linen is removed from room Yes   All plastic bags are removed from the room and replaced with paper trash bags Yes   Patient is in paper scrubs or appropriate gown and using hospital socks with rubber soles Yes   No metal, hard eating utensils or hard plates are on meal tray Yes   Remove all cleaning agents used by [employer] Yes   If Crucifix is hanging on a nail, remove Crucifix as well as the nail Yes       *If any question above is answered \"No,\" documentation is required.       Holden Bryan RN  10/07/24 5184

## 2024-10-07 NOTE — ED NOTES
Pt observed in room with eyes closed. Even chest rise and fall noted. Appears to be in no apparent distress. Sitter at bedside.     Jaxon Somers RN  10/07/24 4767

## 2024-10-07 NOTE — ED NOTES
Pt observed in room with eyes closed. Even chest rise and fall noted. Appears to be in no apparent distress. Sitter at bedside.     Jaxon Somers RN  10/07/24 4013

## 2024-10-07 NOTE — ED NOTES
Pt observed in room with eyes closed. Even chest rise and fall noted. Appears to be in no apparent distress. Sitter at bedside.         Jaxon Somers RN  10/07/24 0908

## 2024-10-07 NOTE — ED NOTES
TRANSFER - OUT REPORT:    Verbal report given to Corby Multani RN on Gino Barakat  being transferred to [facility] for routine progression of patient care       Report consisted of patient's Situation, Background, Assessment and   Recommendations(SBAR).     Information from the following report(s) Nurse Handoff Report and ED Encounter Summary was reviewed with the receiving nurse.    [place] Fall Assessment:    Presents to emergency department  because of falls (Syncope, seizure, or loss of consciousness): No  Age > 70: No  Altered Mental Status, Intoxication with alcohol or substance confusion (Disorientation, impaired judgment, poor safety awaremess, or inability to follow instructions): No  Impaired Mobility: Ambulates or transfers with assistive devices or assistance; Unable to ambulate or transer.: No  Nursing Judgement: No          Lines:   Peripheral IV 10/04/24 Distal;Left Cephalic (Active)        Opportunity for questions and clarification was provided.      Patient transported with: [facility] Holden Hutchison RN  10/07/24 0747

## 2024-11-08 ENCOUNTER — HOSPITAL ENCOUNTER (EMERGENCY)
Age: 39
Discharge: OTHER INSTITUTION | End: 2024-11-08
Attending: EMERGENCY MEDICINE

## 2024-11-08 VITALS
HEIGHT: 69 IN | RESPIRATION RATE: 16 BRPM | TEMPERATURE: 98.2 F | BODY MASS INDEX: 31.1 KG/M2 | OXYGEN SATURATION: 98 % | WEIGHT: 210 LBS | HEART RATE: 62 BPM | DIASTOLIC BLOOD PRESSURE: 78 MMHG | SYSTOLIC BLOOD PRESSURE: 115 MMHG

## 2024-11-08 DIAGNOSIS — F22 PARANOIA (HCC): ICD-10-CM

## 2024-11-08 DIAGNOSIS — R45.851 SUICIDAL IDEATION: Primary | ICD-10-CM

## 2024-11-08 LAB
AMPHET UR QL SCN: NEGATIVE
ANION GAP SERPL CALC-SCNC: 12 MMOL/L (ref 7–16)
APAP SERPL-MCNC: <5 UG/ML (ref 10–30)
APPEARANCE UR: CLEAR
BACTERIA URNS QL MICRO: NEGATIVE /HPF
BARBITURATES UR QL SCN: NEGATIVE
BASOPHILS # BLD: 0.1 K/UL (ref 0–0.2)
BASOPHILS NFR BLD: 1 % (ref 0–2)
BENZODIAZ UR QL: NEGATIVE
BILIRUB UR QL: NEGATIVE
BUN SERPL-MCNC: 14 MG/DL (ref 6–23)
CALCIUM SERPL-MCNC: 9.7 MG/DL (ref 8.8–10.2)
CANNABINOIDS UR QL SCN: NEGATIVE
CHLORIDE SERPL-SCNC: 106 MMOL/L (ref 98–107)
CO2 SERPL-SCNC: 26 MMOL/L (ref 20–29)
COCAINE UR QL SCN: NEGATIVE
COLOR UR: ABNORMAL
CREAT SERPL-MCNC: 1 MG/DL (ref 0.8–1.3)
DIFFERENTIAL METHOD BLD: ABNORMAL
EOSINOPHIL # BLD: 0.1 K/UL (ref 0–0.8)
EOSINOPHIL NFR BLD: 1 % (ref 0.5–7.8)
EPI CELLS #/AREA URNS HPF: ABNORMAL /HPF
ERYTHROCYTE [DISTWIDTH] IN BLOOD BY AUTOMATED COUNT: 12.4 % (ref 11.9–14.6)
ETHANOL SERPL-MCNC: <11 MG/DL (ref 0–0.08)
GLUCOSE SERPL-MCNC: 96 MG/DL (ref 70–99)
GLUCOSE UR STRIP.AUTO-MCNC: NEGATIVE MG/DL
HCT VFR BLD AUTO: 46.6 % (ref 41.1–50.3)
HGB BLD-MCNC: 15.9 G/DL (ref 13.6–17.2)
HGB UR QL STRIP: NEGATIVE
HYALINE CASTS URNS QL MICRO: ABNORMAL /LPF
IMM GRANULOCYTES # BLD AUTO: 0 K/UL (ref 0–0.5)
IMM GRANULOCYTES NFR BLD AUTO: 0 % (ref 0–5)
KETONES UR QL STRIP.AUTO: ABNORMAL MG/DL
LEUKOCYTE ESTERASE UR QL STRIP.AUTO: NEGATIVE
LYMPHOCYTES # BLD: 1 K/UL (ref 0.5–4.6)
LYMPHOCYTES NFR BLD: 13 % (ref 13–44)
MCH RBC QN AUTO: 31.1 PG (ref 26.1–32.9)
MCHC RBC AUTO-ENTMCNC: 34.1 G/DL (ref 31.4–35)
MCV RBC AUTO: 91 FL (ref 82–102)
METHADONE UR QL: NEGATIVE
MONOCYTES # BLD: 0.5 K/UL (ref 0.1–1.3)
MONOCYTES NFR BLD: 6 % (ref 4–12)
NEUTS SEG # BLD: 6.4 K/UL (ref 1.7–8.2)
NEUTS SEG NFR BLD: 79 % (ref 43–78)
NITRITE UR QL STRIP.AUTO: NEGATIVE
NRBC # BLD: 0 K/UL (ref 0–0.2)
OPIATES UR QL: NEGATIVE
PCP UR QL: NEGATIVE
PH UR STRIP: 5.5 (ref 5–9)
PLATELET # BLD AUTO: 162 K/UL (ref 150–450)
PMV BLD AUTO: 12.3 FL (ref 9.4–12.3)
POTASSIUM SERPL-SCNC: 4.4 MMOL/L (ref 3.5–5.1)
PROT UR STRIP-MCNC: 30 MG/DL
RBC # BLD AUTO: 5.12 M/UL (ref 4.23–5.6)
RBC #/AREA URNS HPF: ABNORMAL /HPF
SALICYLATES SERPL-MCNC: <0.5 MG/DL
SODIUM SERPL-SCNC: 144 MMOL/L (ref 136–145)
SP GR UR REFRACTOMETRY: 1.03 (ref 1–1.02)
UROBILINOGEN UR QL STRIP.AUTO: 1 EU/DL (ref 0.2–1)
WBC # BLD AUTO: 8.1 K/UL (ref 4.3–11.1)
WBC URNS QL MICRO: ABNORMAL /HPF

## 2024-11-08 PROCEDURE — 80179 DRUG ASSAY SALICYLATE: CPT

## 2024-11-08 PROCEDURE — 85025 COMPLETE CBC W/AUTO DIFF WBC: CPT

## 2024-11-08 PROCEDURE — 80143 DRUG ASSAY ACETAMINOPHEN: CPT

## 2024-11-08 PROCEDURE — 80307 DRUG TEST PRSMV CHEM ANLYZR: CPT

## 2024-11-08 PROCEDURE — 82077 ASSAY SPEC XCP UR&BREATH IA: CPT

## 2024-11-08 PROCEDURE — 81001 URINALYSIS AUTO W/SCOPE: CPT

## 2024-11-08 PROCEDURE — 99285 EMERGENCY DEPT VISIT HI MDM: CPT

## 2024-11-08 PROCEDURE — 80048 BASIC METABOLIC PNL TOTAL CA: CPT

## 2024-11-08 RX ORDER — CLONIDINE HYDROCHLORIDE 0.1 MG/1
0.1 TABLET ORAL 2 TIMES DAILY
Status: DISCONTINUED | OUTPATIENT
Start: 2024-11-08 | End: 2024-11-08 | Stop reason: HOSPADM

## 2024-11-08 RX ORDER — CYCLOBENZAPRINE HCL 10 MG
10 TABLET ORAL 3 TIMES DAILY PRN
Qty: 21 TABLET | Refills: 0 | Status: SHIPPED | OUTPATIENT
Start: 2024-11-08 | End: 2024-11-08

## 2024-11-08 RX ORDER — TRAZODONE HYDROCHLORIDE 50 MG/1
50 TABLET, FILM COATED ORAL NIGHTLY PRN
Status: DISCONTINUED | OUTPATIENT
Start: 2024-11-08 | End: 2024-11-08 | Stop reason: HOSPADM

## 2024-11-08 RX ORDER — PREDNISONE 20 MG/1
40 TABLET ORAL DAILY
Qty: 10 TABLET | Refills: 0 | Status: SHIPPED | OUTPATIENT
Start: 2024-11-08 | End: 2024-11-08

## 2024-11-08 RX ORDER — HYDROCODONE BITARTRATE AND ACETAMINOPHEN 7.5; 325 MG/1; MG/1
1 TABLET ORAL EVERY 8 HOURS PRN
Qty: 10 TABLET | Refills: 0 | Status: SHIPPED | OUTPATIENT
Start: 2024-11-08 | End: 2024-11-08

## 2024-11-08 RX ORDER — HYDROXYZINE HYDROCHLORIDE 25 MG/1
25 TABLET, FILM COATED ORAL 4 TIMES DAILY PRN
Status: DISCONTINUED | OUTPATIENT
Start: 2024-11-08 | End: 2024-11-08 | Stop reason: HOSPADM

## 2024-11-08 RX ORDER — PROPRANOLOL HYDROCHLORIDE 10 MG/1
20 TABLET ORAL 2 TIMES DAILY
Status: DISCONTINUED | OUTPATIENT
Start: 2024-11-08 | End: 2024-11-08 | Stop reason: HOSPADM

## 2024-11-08 RX ORDER — OLANZAPINE 5 MG/1
20 TABLET ORAL NIGHTLY
Status: DISCONTINUED | OUTPATIENT
Start: 2024-11-08 | End: 2024-11-08 | Stop reason: HOSPADM

## 2024-11-08 RX ORDER — HYDROCODONE BITARTRATE AND ACETAMINOPHEN 10; 325 MG/1; MG/1
1 TABLET ORAL
Status: DISCONTINUED | OUTPATIENT
Start: 2024-11-08 | End: 2024-11-08

## 2024-11-08 RX ORDER — GABAPENTIN 300 MG/1
300 CAPSULE ORAL 3 TIMES DAILY
Status: DISCONTINUED | OUTPATIENT
Start: 2024-11-08 | End: 2024-11-08 | Stop reason: HOSPADM

## 2024-11-08 RX ORDER — MELOXICAM 15 MG/1
15 TABLET ORAL DAILY
Qty: 7 TABLET | Refills: 1 | Status: SHIPPED | OUTPATIENT
Start: 2024-11-08 | End: 2024-11-08

## 2024-11-08 NOTE — ED PROVIDER NOTES
Result Value Ref Range    Sodium 144 136 - 145 mmol/L    Potassium 4.4 3.5 - 5.1 mmol/L    Chloride 106 98 - 107 mmol/L    CO2 26 20 - 29 mmol/L    Anion Gap 12 7 - 16 mmol/L    Glucose 96 70 - 99 mg/dL    BUN 14 6 - 23 MG/DL    Creatinine 1.00 0.80 - 1.30 MG/DL    Est, Glom Filt Rate >90 >60 ml/min/1.73m2    Calcium 9.7 8.8 - 10.2 MG/DL   Ethanol   Result Value Ref Range    Ethanol Lvl <11 MG/DL   Urine Drug Screen   Result Value Ref Range    Phencyclidine, Urine Negative NEG      Benzodiazepines, Urine Negative NEG      Cocaine, Urine Negative NEG      Amphetamine, Urine Negative NEG      Methadone, Urine Negative NEG      THC, TH-Cannabinol, Urine Negative NEG      Opiates, Urine Negative NEG      Barbiturates, Urine Negative NEG     Urinalysis   Result Value Ref Range    Color, UA DARK YELLOW      Appearance CLEAR      Specific Gravity, UA 1.030 (H) 1.001 - 1.023      pH, Urine 5.5 5.0 - 9.0      Protein, UA 30 (A) NEG mg/dL    Glucose, Ur Negative NEG mg/dL    Ketones, Urine TRACE (A) NEG mg/dL    Bilirubin, Urine Negative NEG      Blood, Urine Negative NEG      Urobilinogen, Urine 1.0 0.2 - 1.0 EU/dL    Nitrite, Urine Negative NEG      Leukocyte Esterase, Urine Negative NEG      WBC, UA 0-4 U4 /hpf    RBC, UA 0-5 U5 /hpf    Epithelial Cells, UA 0-5 U5 /hpf    BACTERIA, URINE Negative NEG /hpf    Hyaline Casts, UA 0-2 /lpf   Acetaminophen Level “IF” accidental or intentional ingestion.   Result Value Ref Range    Acetaminophen Level <5 (L) 10 - 30 ug/mL   Salicylate “IF” accidental or intentional ingestion.   Result Value Ref Range    Salicylate Lvl <0.5 MG/DL         No orders to display                No results for input(s): \"COVID19\" in the last 72 hours.     Voice dictation software was used during the making of this note.  This software is not perfect and grammatical and other typographical errors may be present.  This note has not been completely proofread for errors.     Luis F Aguirre MD  11/08/24

## 2024-11-08 NOTE — ED TRIAGE NOTES
Pt c/o feeling suicidal states he is taking care of his mother with no support from local family and its very overwhelming, pt also states his father recently  6 months ago and he just wants to \"finish it all off\" pt states he also is hearing voices and everything feels distorted. Pt states he \"doesn't trust himself\" but denies any specific plan.

## 2024-11-08 NOTE — ED NOTES
Pt states he's \"hearing voices and distorted sounds for the past few months\". Pt feels he hasn't been sleeping much\". States that he has been overwhelmed \"taking care of his 80yro mother\" and does not \"feel like he's getting support from his family\". Pt feels he \"can't take care of her\" and that's \"been taking a toll on him\". Feels like his \"thoughts are not fluid\", like he's \"having rapid disorganized thoughts\". Pt states that he doesn't feel like he can trust himself. Pt states the voices he's hearing are saying \"to take care of his mom\".

## 2024-11-08 NOTE — CARE COORDINATION
Chart reviewed. Patient admitted to [facility] at the beginning of October, discharged with follow up at [facility]. Patient states he went to [facility] for a therapy appointment and has an upcoming appointment with psychiatry. Patient reports feeling much better after discharging from [facility]. However, for the last week and a half he's been having SI. Patient has made no attempts at self-harm and states that if he were to harm himself he'd \"wreck my car or walk into traffic.\" Patient states he's been compliant with medications. Dispo pending psychiatry eval.     1:00 pm: Decision made by psychiatry to place the patient on IVC. White papers prepared, once notarized referrals will be faxed to [facility], [facility], [facility], [facility], [facility], and [facility].     3:19 pm: MI spoke with Radha with [facility] who states Dr. Sánchez has offered the patient a bed to [address]. Number for report is [phone]. MI to call [facility] for transport. MD completing EMTALA.     Mattie Lombardo, MI  Medical Social Worker  [facility]

## 2024-11-08 NOTE — ED NOTES
TRANSFER - OUT REPORT:    Verbal report given to Lynn Barakat RN on Gino Barakat  being transferred to [facility] for routine progression of patient care       Report consisted of patient's Situation, Background, Assessment and   Recommendations(SBAR).     Information from the following report(s) Nurse Handoff Report, ED Encounter Summary, ED SBAR, Adult Overview, MAR, Recent Results, Neuro Assessment, and Event Log was reviewed with the receiving nurse.    Lines:   Peripheral IV 11/08/24 Left Antecubital (Active)   Site Assessment Clean, dry & intact 11/08/24 0926   Line Status Brisk blood return 11/08/24 0926   Line Care Line pulled back 11/08/24 0926   Phlebitis Assessment No symptoms 11/08/24 0926   Infiltration Assessment 0 11/08/24 0926        Opportunity for questions and clarification was provided.      Patient transported with:  [facility]

## 2024-11-08 NOTE — ED NOTES
Constant Observer Yes - Name: Maurilio Zepeda Observer Oriented yes   High risk patients are in line of sight at all times Yes   Excess equipment/medical supplies not necessary for the care of the patient removed Yes   All sharp or dangerous objects are removed from room: including but not limited to belts, pens & pencils, needles, medications, cosmetics, lighters, matches, nail files, watches, necklaces, glass objects, razors, razor blades, knives, aerosol sprays, drawstring pants, shoes, cords (telephone, call bells, etc.) cleaning wipes or other cleaning items, aluminum cans, not permanently attached wall décor Yes   Telephone/cell phone removed as well as TV remote (batteries can be swallowed) Yes   Patient belongings removed and labeled at nurses station No - Per Dr. Aguirre, defer removing pt's belongings until pt has consulted with SOC.    Excess linen is removed from room Yes   All plastic bags are removed from the room and replaced with paper trash bags Yes   Patient is in paper scrubs or appropriate gown and using hospital socks with rubber soles No - Per Dr. Aguirre, defer hospital gown/paper scrubs until pt has consulted with SOC. Pt provided with rubber sole socks at this time.   No metal, hard eating utensils or hard plates are on meal tray Yes   Remove all cleaning agents used by [employer] Yes   If Crucifix is hanging on a nail, remove Crucifix as well as the nail Yes       *If any question above is answered \"No,\" documentation is required.

## 2024-11-08 NOTE — ED NOTES
Ori Jimenez relieved Jason. Liz oriented and provided with 1:1 observation form. MD advised to wait for Psych consult to dress pt out to paper scrubs.